# Patient Record
Sex: FEMALE | Race: WHITE | NOT HISPANIC OR LATINO | Employment: UNEMPLOYED | URBAN - METROPOLITAN AREA
[De-identification: names, ages, dates, MRNs, and addresses within clinical notes are randomized per-mention and may not be internally consistent; named-entity substitution may affect disease eponyms.]

---

## 2023-04-25 DIAGNOSIS — Z12.11 SCREENING FOR COLON CANCER: Primary | ICD-10-CM

## 2023-06-05 DIAGNOSIS — Z12.11 SCREENING FOR COLON CANCER: ICD-10-CM

## 2023-06-05 DIAGNOSIS — Z80.0 FAMILY HISTORY OF COLON CANCER: Primary | ICD-10-CM

## 2023-06-05 NOTE — TELEPHONE ENCOUNTER
----- Message from Finn Wilhelm MA sent at 6/5/2023  2:29 PM CDT -----  Contact: Fiona    ----- Message -----  From: Rachele Camp LPN  Sent: 5/24/2023  10:14 AM CDT  To: [facility]ology Procedure Scheduling      ----- Message -----  From: Emeli Hall  Sent: 5/24/2023   8:41 AM CDT  To: [facility] Staff    Fiona needs  call back at [phone], Regards to getting her colonoscopy screening schedule with referral.    Thanks  Td

## 2023-06-07 VITALS — WEIGHT: 188 LBS | HEIGHT: 63 IN | BODY MASS INDEX: 33.31 KG/M2

## 2023-06-07 RX ORDER — ROSUVASTATIN CALCIUM 10 MG/1
10 TABLET, COATED ORAL
COMMUNITY
Start: 2023-06-02

## 2023-06-07 RX ORDER — SOD SULF/POT CHLORIDE/MAG SULF 1.479 G
12 TABLET ORAL DAILY
Qty: 24 TABLET | Refills: 0 | Status: SHIPPED | OUTPATIENT
Start: 2023-06-07 | End: 2023-06-23

## 2023-06-07 RX ORDER — VENLAFAXINE HYDROCHLORIDE 150 MG/1
CAPSULE, EXTENDED RELEASE ORAL
COMMUNITY
Start: 2023-04-01

## 2023-06-07 NOTE — TELEPHONE ENCOUNTER
Returned callers call and got her scheduled for her first colonoscopy. Pt voiced no hx of cpap, heart stent or walking problem. Pt voiced understood information will be emailed and link also

## 2023-06-14 DIAGNOSIS — R31.9 HEMATURIA: Primary | ICD-10-CM

## 2023-06-23 ENCOUNTER — OFFICE VISIT (OUTPATIENT)
Dept: OBSTETRICS AND GYNECOLOGY | Facility: CLINIC | Age: 48
End: 2023-06-23
Payer: COMMERCIAL

## 2023-06-23 VITALS
SYSTOLIC BLOOD PRESSURE: 116 MMHG | HEART RATE: 93 BPM | BODY MASS INDEX: 33.3 KG/M2 | WEIGHT: 188 LBS | DIASTOLIC BLOOD PRESSURE: 75 MMHG

## 2023-06-23 DIAGNOSIS — Z80.3 FAMILY HISTORY OF BREAST CANCER: ICD-10-CM

## 2023-06-23 DIAGNOSIS — Z91.89 HIGH RISK OF OVARIAN CANCER: ICD-10-CM

## 2023-06-23 DIAGNOSIS — Z91.89 INCREASED RISK OF BREAST CANCER: ICD-10-CM

## 2023-06-23 DIAGNOSIS — Z12.31 SCREENING MAMMOGRAM FOR BREAST CANCER: ICD-10-CM

## 2023-06-23 DIAGNOSIS — Z01.419 WELL WOMAN EXAM WITH ROUTINE GYNECOLOGICAL EXAM: Primary | ICD-10-CM

## 2023-06-23 PROCEDURE — 3008F PR BODY MASS INDEX (BMI) DOCUMENTED: ICD-10-PCS | Mod: CPTII,S$GLB,,

## 2023-06-23 PROCEDURE — 3008F BODY MASS INDEX DOCD: CPT | Mod: CPTII,S$GLB,,

## 2023-06-23 PROCEDURE — 99386 PREV VISIT NEW AGE 40-64: CPT | Mod: S$GLB,,,

## 2023-06-23 PROCEDURE — 3074F PR MOST RECENT SYSTOLIC BLOOD PRESSURE < 130 MM HG: ICD-10-PCS | Mod: CPTII,S$GLB,,

## 2023-06-23 PROCEDURE — 3074F SYST BP LT 130 MM HG: CPT | Mod: CPTII,S$GLB,,

## 2023-06-23 PROCEDURE — 1159F PR MEDICATION LIST DOCUMENTED IN MEDICAL RECORD: ICD-10-PCS | Mod: CPTII,S$GLB,,

## 2023-06-23 PROCEDURE — 3078F DIAST BP <80 MM HG: CPT | Mod: CPTII,S$GLB,,

## 2023-06-23 PROCEDURE — 1159F MED LIST DOCD IN RCRD: CPT | Mod: CPTII,S$GLB,,

## 2023-06-23 PROCEDURE — 3078F PR MOST RECENT DIASTOLIC BLOOD PRESSURE < 80 MM HG: ICD-10-PCS | Mod: CPTII,S$GLB,,

## 2023-06-23 PROCEDURE — 99386 PR PREVENTIVE VISIT,NEW,40-64: ICD-10-PCS | Mod: S$GLB,,,

## 2023-06-23 RX ORDER — POLYETHYLENE GLYCOL 3350, SODIUM SULFATE ANHYDROUS, SODIUM BICARBONATE, SODIUM CHLORIDE, POTASSIUM CHLORIDE 236; 22.74; 6.74; 5.86; 2.97 G/4L; G/4L; G/4L; G/4L; G/4L
POWDER, FOR SOLUTION ORAL
COMMUNITY
Start: 2023-06-15

## 2023-06-23 RX ORDER — VENLAFAXINE HYDROCHLORIDE 37.5 MG/1
CAPSULE, EXTENDED RELEASE ORAL
COMMUNITY
Start: 2023-06-05

## 2023-06-23 NOTE — PROGRESS NOTES
Subjective:      Patient ID: Fiona Siegel is a 47 y.o. female who presents for evaluation today.    Chief Complaint:    Well Woman      History of Present Illness  Annual Exam (Premenopausal) - Patient presents for annual exam. The patient has no complaints today. The patient is sexually active. GYN screening history: last pap: was abnormal: unknown result and last mammogram: was normal. The patient wears seatbelts: yes. The patient participates in regular exercise: not asked. Has the patient ever been transfused or tattooed?: not asked. The patient reports that there is not domestic violence in her life. She recently did genetic testing and is BRCA2+ with increased lifetime risk. Her grandmother & 3 aunts had breast CA. Last mammo was normal on . We discussed adding yearly MRIs. She has one ovary, has had an ablation in . She has not had bleeding in 5-6 years. She struggles with night sweats & hot flashes, vaginal dryness, & low libido. We discussed Bonafide supplements due to her family history & increased risk of breast cancer. She is seeing urology next week for hematuria. Bowels are regular. She is seeing oncology Dr. Meredith tai, referred by her PCP due to abnormal labs.    GYN History  No LMP recorded (lmp unknown). Patient has had an ablation.   Date of Last Pap: Pap smear completed today with HPV co-testing    VITALS  /75   Pulse 93   Wt 85.3 kg (188 lb)   LMP  (LMP Unknown)   BMI 33.30 kg/m²   Weight: 85.3 kg (188 lb)         PAST MEDICAL HISTORY  Past Medical History:   Diagnosis Date    Amenorrhea Age 11    Irregular periods since onset at age 11    Anxiety     Take anti depression meds    BMI 33.0-33.9,adult     Depression     Encounter for blood transfusion 89    Emergency c section    High cholesterol     Hormone disorder Always    Low estrogen       PAST SURGICAL HISTORY  Past Surgical History:   Procedure Laterality Date    ABLATION       SECTION      X 2     CHOLECYSTECTOMY      ENDOMETRIAL ABLATION  7/2009    HYSTEROSCOPY  7/2009    RBA    OOPHORECTOMY  July 2009    Right ovary /tube removed    TUBAL LIGATION  1/1998    After birth of 2nd child       SOCIAL HISTORY  Social History     Tobacco Use   Smoking Status Every Day    Packs/day: 1.00    Years: 15.00    Pack years: 15.00    Types: Cigarettes   Smokeless Tobacco Never   Tobacco Comments    Smoke 1/2 pack - 1 pack day   ,   Social History     Substance and Sexual Activity   Alcohol Use Not Currently    Alcohol/week: 3.0 standard drinks    Types: 3 Cans of beer per week    Comment: Occasionally on holidays        MEDICATIONS  Outpatient Medications Marked as Taking for the 6/23/23 encounter (Office Visit) with Imani Lara NP   Medication Sig Dispense Refill    polyethylene glycol (GOLYTELY) 236-22.74-6.74 -5.86 gram suspension       rosuvastatin (CRESTOR) 10 MG tablet Take 10 mg by mouth.      venlafaxine (EFFEXOR-XR) 150 MG Cp24       venlafaxine (EFFEXOR-XR) 37.5 MG 24 hr capsule TAKE 1 CAPSULE BY MOUTH ONCE DAILY TO BE TAKEN WITH 150MG DOSAGE           Review of Systems   Review of Systems   Constitutional:  Negative for activity change, chills and fever.   Eyes:  Negative for visual disturbance.   Respiratory:  Negative for shortness of breath.    Cardiovascular:  Negative for chest pain.   Gastrointestinal:  Negative for abdominal pain, constipation, diarrhea, nausea and vomiting.   Genitourinary:  Positive for decreased libido, flank pain, frequency, hot flashes, urgency and vaginal dryness. Negative for dysuria, hematuria, menorrhagia, pelvic pain, vaginal bleeding and vaginal discharge.        No abnormal vaginal bleeding   Musculoskeletal:  Negative for back pain.   Integumentary:  Negative for rash and breast mass.   Neurological:  Negative for numbness and headaches.   Psychiatric/Behavioral:          No mood disturbance or changes    Breast: Negative for mass        Objective:     Physical Exam:    Constitutional: She is oriented to person, place, and time. She appears well-developed. She is cooperative.    HENT:   Head: Normocephalic.     Neck: Trachea normal. No thyromegaly present.    Cardiovascular:  Normal rate, regular rhythm and normal heart sounds.             Pulmonary/Chest: Effort normal and breath sounds normal. Right breast exhibits no mass, no nipple discharge and no skin change. Left breast exhibits no mass, no nipple discharge and no skin change.        Abdominal: Soft. There is no abdominal tenderness. There is no rebound and no guarding.     Genitourinary:    Vagina and uterus normal.      Pelvic exam was performed with patient supine.   The external female genitalia was normal.   Labial bartholins normal.There is no lesion on the right labia. There is no lesion on the left labia. Cervix is normal. Right adnexum displays no mass and no tenderness. Left adnexum displays no mass and no tenderness. Cervix exhibits no discharge.    pap smear completedUterus is not enlarged and not tender.              Lymphadenopathy:        Head (right side): No submental and no submandibular adenopathy present.        Head (left side): No submental and no submandibular adenopathy present.     She has no cervical adenopathy.    Neurological: She is alert and oriented to person, place, and time.    Skin: Skin is warm.    Psychiatric: She has a normal mood and affect. Her speech is normal and behavior is normal. Thought content normal.        Assessment:        1. Well woman exam with routine gynecological exam    2. Screening mammogram for breast cancer    3. Increased risk of breast cancer    4. High risk of ovarian cancer    5. Family history of breast cancer       Well woman exam with routine gynecological exam  -     Liquid-based pap smear, screening    Screening mammogram for breast cancer  -     Mammo Digital Screening Bilat; Future; Expected date: 06/23/2023    Increased risk of breast cancer  -     MRI  Breast w/wo Contrast, w/CAD, Bilateral; Future; Expected date: 06/23/2023    High risk of ovarian cancer  -     ; Future; Expected date: 06/23/2023  -     US OB/GYN Procedure ([facility]); Future    Family history of breast cancer  -     MRI Breast w/wo Contrast, w/CAD, Bilateral; Future; Expected date: 06/23/2023         Plan:     Patient instructed to contact the clinic should any questions or concerns arise prior to her next office visit. Patient is happy with the plan of care at this time, verbalizes understanding and denies outstanding questions.      Pap  Mammogram+MRI  Self-breast exams  Bonafide samples & info given  Consider annual health panel with Primary Care if not done  Colonoscopy as indicated  If you don't hear from the office regarding results within 1 week, please call  Follow up in 1 year for annual or sooner as needed  Chaperone present for exam

## 2023-06-26 ENCOUNTER — TELEPHONE (OUTPATIENT)
Dept: OBSTETRICS AND GYNECOLOGY | Facility: CLINIC | Age: 48
End: 2023-06-26
Payer: COMMERCIAL

## 2023-06-26 DIAGNOSIS — Z12.11 SCREENING FOR COLON CANCER: ICD-10-CM

## 2023-06-26 DIAGNOSIS — Z80.0 FAMILY HISTORY OF COLON CANCER: Primary | ICD-10-CM

## 2023-06-26 NOTE — PROGRESS NOTES
"[facility]  [address] [address]  Phone: [phone]  Fax: [phone]    History & Physical         Provider: Dr. Jalil Arriaga    Patient Name: Fiona JACKSON (age):1975  47 y.o.           Gender: female   Phone: [phone]     Referring Physician: Josh Hernández     Vital Signs:   Height - 5' 3"  Weight - 188 lbs  BMI -  33.30    Plan: Colonoscopy    Encounter Diagnoses   Name Primary?    Family history of colon cancer Yes    Screening for colon cancer            History:      Past Medical History:   Diagnosis Date    Amenorrhea Age 11    Irregular periods since onset at age 11    Anxiety     Take anti depression meds    BMI 33.0-33.9,adult     Depression     Encounter for blood transfusion 89    Emergency c section    High cholesterol     Hormone disorder Always    Low estrogen      Past Surgical History:   Procedure Laterality Date    ABLATION       SECTION      X 2    CHOLECYSTECTOMY      ENDOMETRIAL ABLATION  2009    HYSTEROSCOPY  2009    RBA    OOPHORECTOMY  2009    Right ovary /tube removed    TUBAL LIGATION  1998    After birth of 2nd child      Medication List with Changes/Refills   Current Medications    POLYETHYLENE GLYCOL (GOLYTELY) 236-22.74-6.74 -5.86 GRAM SUSPENSION        ROSUVASTATIN (CRESTOR) 10 MG TABLET    Take 10 mg by mouth.    VENLAFAXINE (EFFEXOR-XR) 150 MG CP24        VENLAFAXINE (EFFEXOR-XR) 37.5 MG 24 HR CAPSULE    TAKE 1 CAPSULE BY MOUTH ONCE DAILY TO BE TAKEN WITH 150MG DOSAGE      Review of patient's allergies indicates:   Allergen Reactions    Sulfa (sulfonamide antibiotics)       Family History   Problem Relation Age of Onset    Asthma Mother     Migraines Mother     Lung cancer Father 50    Cancer Father         Lung /asbestos    No Known Problems Brother     Asthma Maternal Grandmother     Breast cancer Maternal Grandmother "     Cancer Maternal Grandmother         Breast cancer spread into lung cancer    No Known Problems Maternal Grandfather     Diabetes Paternal Grandmother     No Known Problems Paternal Grandfather     Colon cancer Maternal Aunt     Breast cancer Maternal Aunt     Colon cancer Cousin     Colon cancer Maternal Cousin     Cancer Maternal Uncle         Blood cancer    Cancer Paternal Aunt         Blood cancer      Social History     Tobacco Use    Smoking status: Every Day     Packs/day: 1.00     Years: 15.00     Pack years: 15.00     Types: Cigarettes    Smokeless tobacco: Never    Tobacco comments:     Smoke 1/2 pack - 1 pack day   Substance Use Topics    Alcohol use: Not Currently     Alcohol/week: 3.0 standard drinks     Types: 3 Cans of beer per week     Comment: Occasionally on holidays    Drug use: Never        Physical Examination:     General Appearance:___________________________  HEENT: _____________________________________  Abdomen:____________________________________  Heart:________________________________________  Lungs:_______________________________________  Extremities:___________________________________  Skin:_________________________________________  Endocrine:____________________________________  Genitourinary:_________________________________  Neurological:__________________________________      Patient has been evaluated immediately prior to sedation and is medically cleared for endoscopy with IVCS as an ASA class: ______      Physician Signature: _________________________       Date: ________  Time: ________

## 2023-06-26 NOTE — TELEPHONE ENCOUNTER
Spoke with pt to let her know that her ins is out of network. I advised her to call her ins and ask them who is in network, and we can fax her MRI order there. She understood and will let me know.

## 2023-06-28 ENCOUNTER — TELEPHONE (OUTPATIENT)
Dept: OBSTETRICS AND GYNECOLOGY | Facility: CLINIC | Age: 48
End: 2023-06-28
Payer: COMMERCIAL

## 2023-06-28 NOTE — TELEPHONE ENCOUNTER
----- Message from Erin Lai sent at 2023 10:09 AM CDT -----  Contact: self  She received a notification that a report was ready for her, but upon viewing it in [employer] it has a different patient's name and . Please call back at [phone]

## 2023-06-30 ENCOUNTER — OFFICE VISIT (OUTPATIENT)
Dept: UROLOGY | Facility: CLINIC | Age: 48
End: 2023-06-30
Payer: COMMERCIAL

## 2023-06-30 VITALS
DIASTOLIC BLOOD PRESSURE: 64 MMHG | WEIGHT: 186 LBS | RESPIRATION RATE: 20 BRPM | HEIGHT: 63 IN | HEART RATE: 104 BPM | BODY MASS INDEX: 32.96 KG/M2 | SYSTOLIC BLOOD PRESSURE: 127 MMHG

## 2023-06-30 DIAGNOSIS — R31.9 HEMATURIA: ICD-10-CM

## 2023-06-30 DIAGNOSIS — R31.29 MICROSCOPIC HEMATURIA: Primary | ICD-10-CM

## 2023-06-30 LAB
BILIRUBIN, UA POC OHS: NEGATIVE
BLOOD, UA POC OHS: ABNORMAL
CLARITY, UA POC OHS: CLEAR
COLOR, UA POC OHS: YELLOW
GLUCOSE, UA POC OHS: NEGATIVE
KETONES, UA POC OHS: NEGATIVE
LEUKOCYTES, UA POC OHS: ABNORMAL
NITRITE, UA POC OHS: NEGATIVE
PH, UA POC OHS: 5.5
PROTEIN, UA POC OHS: 100
SPECIFIC GRAVITY, UA POC OHS: 1.01
UROBILINOGEN, UA POC OHS: 1

## 2023-06-30 PROCEDURE — 1160F PR REVIEW ALL MEDS BY PRESCRIBER/CLIN PHARMACIST DOCUMENTED: ICD-10-PCS | Mod: CPTII,S$GLB,, | Performed by: FAMILY MEDICINE

## 2023-06-30 PROCEDURE — 1160F RVW MEDS BY RX/DR IN RCRD: CPT | Mod: CPTII,S$GLB,, | Performed by: FAMILY MEDICINE

## 2023-06-30 PROCEDURE — 3008F PR BODY MASS INDEX (BMI) DOCUMENTED: ICD-10-PCS | Mod: CPTII,S$GLB,, | Performed by: FAMILY MEDICINE

## 2023-06-30 PROCEDURE — 99214 PR OFFICE/OUTPT VISIT, EST, LEVL IV, 30-39 MIN: ICD-10-PCS | Mod: S$GLB,,, | Performed by: FAMILY MEDICINE

## 2023-06-30 PROCEDURE — 3078F DIAST BP <80 MM HG: CPT | Mod: CPTII,S$GLB,, | Performed by: FAMILY MEDICINE

## 2023-06-30 PROCEDURE — 3078F PR MOST RECENT DIASTOLIC BLOOD PRESSURE < 80 MM HG: ICD-10-PCS | Mod: CPTII,S$GLB,, | Performed by: FAMILY MEDICINE

## 2023-06-30 PROCEDURE — 3008F BODY MASS INDEX DOCD: CPT | Mod: CPTII,S$GLB,, | Performed by: FAMILY MEDICINE

## 2023-06-30 PROCEDURE — 81003 POCT URINALYSIS(INSTRUMENT): ICD-10-PCS | Mod: QW,S$GLB,, | Performed by: FAMILY MEDICINE

## 2023-06-30 PROCEDURE — 1159F MED LIST DOCD IN RCRD: CPT | Mod: CPTII,S$GLB,, | Performed by: FAMILY MEDICINE

## 2023-06-30 PROCEDURE — 99214 OFFICE O/P EST MOD 30 MIN: CPT | Mod: S$GLB,,, | Performed by: FAMILY MEDICINE

## 2023-06-30 PROCEDURE — 3074F PR MOST RECENT SYSTOLIC BLOOD PRESSURE < 130 MM HG: ICD-10-PCS | Mod: CPTII,S$GLB,, | Performed by: FAMILY MEDICINE

## 2023-06-30 PROCEDURE — 81003 URINALYSIS AUTO W/O SCOPE: CPT | Mod: QW,S$GLB,, | Performed by: FAMILY MEDICINE

## 2023-06-30 PROCEDURE — 1159F PR MEDICATION LIST DOCUMENTED IN MEDICAL RECORD: ICD-10-PCS | Mod: CPTII,S$GLB,, | Performed by: FAMILY MEDICINE

## 2023-06-30 PROCEDURE — 3074F SYST BP LT 130 MM HG: CPT | Mod: CPTII,S$GLB,, | Performed by: FAMILY MEDICINE

## 2023-06-30 NOTE — PROGRESS NOTES
Subjective:       Patient ID: Fiona Siegel is a 47 y.o. female.    Chief Complaint: microscopic hematuria      HPI: 47-year-old female patient presenting to the clinic to establish care for microscopic hematuria.  Patient has been told that she has blood in her urine on her last 3 consecutive urinalysis.  Patient denies gross hematuria.  She is a smoker.  She is been smoking for the past 15 years.  She had a renal ultrasound done recently that she states was negative.  Asked the patient to obtain the report of this ultrasound.       Past Medical History:   Past Medical History:   Diagnosis Date    Amenorrhea Age 11    Irregular periods since onset at age 11    Anxiety     Take anti depression meds    BMI 33.0-33.9,adult     Depression     Encounter for blood transfusion 89    Emergency c section    High cholesterol     Hormone disorder Always    Low estrogen       Past Surgical Historical:   Past Surgical History:   Procedure Laterality Date    ABLATION       SECTION      X 2    CHOLECYSTECTOMY      ENDOMETRIAL ABLATION  2009    HYSTEROSCOPY  2009    RBA    OOPHORECTOMY  2009    Right ovary /tube removed    TUBAL LIGATION  1998    After birth of 2nd child        Medications:   Medication List with Changes/Refills   Current Medications    POLYETHYLENE GLYCOL (GOLYTELY) 236-22.74-6.74 -5.86 GRAM SUSPENSION        ROSUVASTATIN (CRESTOR) 10 MG TABLET    Take 10 mg by mouth.    VENLAFAXINE (EFFEXOR-XR) 150 MG CP24        VENLAFAXINE (EFFEXOR-XR) 37.5 MG 24 HR CAPSULE    TAKE 1 CAPSULE BY MOUTH ONCE DAILY TO BE TAKEN WITH 150MG DOSAGE        Past Social History:   Social History     Socioeconomic History    Marital status:      Spouse name: Daren Siegel    Number of children: 2   Tobacco Use    Smoking status: Every Day     Packs/day: 1.00     Years: 15.00     Pack years: 15.00     Types: Cigarettes    Smokeless tobacco: Never    Tobacco comments:     Smoke 1/2 pack - 1 pack day   Substance  and Sexual Activity    Alcohol use: Not Currently     Alcohol/week: 3.0 standard drinks     Types: 3 Cans of beer per week     Comment: Occasionally on holidays    Drug use: Never    Sexual activity: Yes     Partners: Male     Birth control/protection: None       Allergies:   Review of patient's allergies indicates:   Allergen Reactions    Sulfa (sulfonamide antibiotics)         Family History:   Family History   Problem Relation Age of Onset    Asthma Mother     Migraines Mother     Lung cancer Father 50    Cancer Father         Lung /asbestos    No Known Problems Brother     Asthma Maternal Grandmother     Breast cancer Maternal Grandmother     Cancer Maternal Grandmother         Breast cancer spread into lung cancer    No Known Problems Maternal Grandfather     Diabetes Paternal Grandmother     No Known Problems Paternal Grandfather     Colon cancer Maternal Aunt     Breast cancer Maternal Aunt     Colon cancer Cousin     Colon cancer Maternal Cousin     Cancer Maternal Uncle         Blood cancer    Cancer Paternal Aunt         Blood cancer        Review of Systems:  Review of Systems   Constitutional:  Negative for activity change, appetite change, chills, diaphoresis, fatigue, fever and unexpected weight change.   HENT:  Negative for congestion, dental problem, drooling, ear discharge, ear pain, facial swelling, hearing loss, mouth sores, nosebleeds, postnasal drip, rhinorrhea, sinus pressure, sinus pain, sneezing, sore throat, tinnitus, trouble swallowing and voice change.    Eyes:  Negative for photophobia, pain, discharge, redness, itching and visual disturbance.   Respiratory:  Negative for apnea, cough, choking, chest tightness, shortness of breath, wheezing and stridor.    Cardiovascular:  Negative for chest pain and leg swelling.   Gastrointestinal:  Negative for abdominal distention, abdominal pain, anal bleeding, blood in stool, constipation, diarrhea, nausea, rectal pain and vomiting.   Endocrine:  Negative for cold intolerance, heat intolerance, polydipsia, polyphagia and polyuria.   Genitourinary:  Positive for urgency. Negative for decreased urine volume, difficulty urinating, dyspareunia, dysuria, enuresis, flank pain, frequency, genital sores, hematuria, menstrual problem, pelvic pain, vaginal bleeding, vaginal discharge and vaginal pain.   Musculoskeletal:  Negative for arthralgias, back pain, gait problem, joint swelling, myalgias, neck pain and neck stiffness.   Skin:  Negative for color change, pallor, rash and wound.   Allergic/Immunologic: Negative for environmental allergies, food allergies and immunocompromised state.   Neurological:  Negative for dizziness, tremors, seizures, syncope, facial asymmetry, speech difficulty, weakness, light-headedness, numbness and headaches.   Hematological:  Negative for adenopathy. Does not bruise/bleed easily.   Psychiatric/Behavioral:  Negative for agitation, behavioral problems, confusion, decreased concentration, dysphoric mood, hallucinations, self-injury, sleep disturbance and suicidal ideas. The patient is not nervous/anxious and is not hyperactive.      Physical Exam:  Physical Exam  Exam conducted with a chaperone present.   Constitutional:       Appearance: Normal appearance.   HENT:      Head: Normocephalic.      Nose: Nose normal.      Mouth/Throat:      Mouth: Mucous membranes are moist.      Pharynx: Oropharynx is clear.   Eyes:      Extraocular Movements: Extraocular movements intact.      Conjunctiva/sclera: Conjunctivae normal.      Pupils: Pupils are equal, round, and reactive to light.   Cardiovascular:      Rate and Rhythm: Normal rate and regular rhythm.      Pulses: Normal pulses.      Heart sounds: Normal heart sounds.   Pulmonary:      Effort: Pulmonary effort is normal.      Breath sounds: Normal breath sounds.   Abdominal:      General: Abdomen is flat. Bowel sounds are normal.      Palpations: Abdomen is soft.      Hernia: There is no  hernia in the left inguinal area or right inguinal area.   Genitourinary:     General: Normal vulva.      Pubic Area: No rash or pubic lice.       Labia:         Right: No rash, tenderness, lesion or injury.         Left: No rash, tenderness, lesion or injury.       Urethra: No prolapse, urethral pain, urethral swelling or urethral lesion.      Rectum: Normal.   Musculoskeletal:         General: Normal range of motion.      Cervical back: Normal range of motion and neck supple.   Lymphadenopathy:      Lower Body: No right inguinal adenopathy. No left inguinal adenopathy.   Skin:     General: Skin is warm and dry.      Capillary Refill: Capillary refill takes less than 2 seconds.   Neurological:      General: No focal deficit present.      Mental Status: She is alert and oriented to person, place, and time. Mental status is at baseline.   Psychiatric:         Mood and Affect: Mood normal.         Behavior: Behavior normal.         Thought Content: Thought content normal.         Judgment: Judgment normal.       Assessment/Plan:       Microscopic hematuria:  We will complete hematuria workup for this patient including cystoscopy.  She has had a recent negative renal ultrasound.  She is a smoker.  She denies any dysuria at this time.  Instructed patient to obtain report of renal ultrasound.  Urinalysis today shows 0-5 RBC    Urinary urge incontinence:  Long discussion about symptom improvement of her urinary incontinence.  Decrease fluid intake.  She complains of nocturia 2-3 times a night.  Instructed patient to decrease fluid intake 4 hours for foregoing asleep.  We also discussed timed voiding.  She would like to avoid taking any medications at this point due to multiple medical conditions and procedures in the upcoming future.  We will schedule her for six-month follow-up to talk about medications for this issue after she gets through some of these procedures.    Problem List Items Addressed This Visit     None  Visit Diagnoses       Microscopic hematuria    -  Primary    Relevant Orders    Cystoscopy    Hematuria        Relevant Orders    POCT Urinalysis(Instrument) (Completed)

## 2023-07-05 ENCOUNTER — OUTSIDE PLACE OF SERVICE (OUTPATIENT)
Dept: GASTROENTEROLOGY | Facility: CLINIC | Age: 48
End: 2023-07-05

## 2023-07-05 LAB — CRC RECOMMENDATION EXT: NORMAL

## 2023-07-05 PROCEDURE — G0105 COLORECTAL SCRN; HI RISK IND: HCPCS | Mod: ,,, | Performed by: INTERNAL MEDICINE

## 2023-07-05 PROCEDURE — G0105 COLORECTAL SCRN; HI RISK IND: ICD-10-PCS | Mod: ,,, | Performed by: INTERNAL MEDICINE

## 2023-07-06 DIAGNOSIS — N76.0 BACTERIAL VAGINOSIS: Primary | ICD-10-CM

## 2023-07-06 DIAGNOSIS — B96.89 BACTERIAL VAGINOSIS: Primary | ICD-10-CM

## 2023-07-06 LAB
CANCER AG125 SERPL-ACNC: 4.39 U/ML (ref 0–38.1)
Lab: NORMAL

## 2023-07-06 RX ORDER — TINIDAZOLE 500 MG/1
2 TABLET ORAL
Qty: 8 TABLET | Refills: 0 | Status: SHIPPED | OUTPATIENT
Start: 2023-07-06 | End: 2023-07-08

## 2023-07-21 ENCOUNTER — PROCEDURE VISIT (OUTPATIENT)
Dept: OBSTETRICS AND GYNECOLOGY | Facility: CLINIC | Age: 48
End: 2023-07-21
Payer: COMMERCIAL

## 2023-07-21 DIAGNOSIS — Z91.89 HIGH RISK OF OVARIAN CANCER: ICD-10-CM

## 2023-07-21 PROCEDURE — 76830 US OB/GYN PROCEDURE (VIEWPOINT): ICD-10-PCS | Mod: S$GLB,,, | Performed by: OBSTETRICS & GYNECOLOGY

## 2023-07-21 PROCEDURE — 76830 TRANSVAGINAL US NON-OB: CPT | Mod: S$GLB,,, | Performed by: OBSTETRICS & GYNECOLOGY

## 2023-07-24 ENCOUNTER — PATIENT MESSAGE (OUTPATIENT)
Dept: OBSTETRICS AND GYNECOLOGY | Facility: CLINIC | Age: 48
End: 2023-07-24
Payer: COMMERCIAL

## 2023-07-31 ENCOUNTER — TELEPHONE (OUTPATIENT)
Dept: UROLOGY | Facility: CLINIC | Age: 48
End: 2023-07-31
Payer: COMMERCIAL

## 2023-08-01 ENCOUNTER — PROCEDURE VISIT (OUTPATIENT)
Dept: UROLOGY | Facility: CLINIC | Age: 48
End: 2023-08-01
Payer: COMMERCIAL

## 2023-08-01 VITALS
HEIGHT: 63 IN | BODY MASS INDEX: 32.69 KG/M2 | OXYGEN SATURATION: 98 % | HEART RATE: 84 BPM | DIASTOLIC BLOOD PRESSURE: 69 MMHG | SYSTOLIC BLOOD PRESSURE: 120 MMHG | WEIGHT: 184.5 LBS | RESPIRATION RATE: 20 BRPM

## 2023-08-01 DIAGNOSIS — R31.29 MICROSCOPIC HEMATURIA: ICD-10-CM

## 2023-08-01 PROCEDURE — 52000 CYSTOSCOPY: ICD-10-PCS | Mod: S$GLB,,, | Performed by: UROLOGY

## 2023-08-01 PROCEDURE — 52000 CYSTOURETHROSCOPY: CPT | Mod: S$GLB,,, | Performed by: UROLOGY

## 2023-08-01 NOTE — PATIENT INSTRUCTIONS
Patient Education       Cystoscopy Discharge Instructions   About this topic   Your kidneys make urine. It is stored in your bladder. The urethra is a tube at the bottom of the bladder. Urine flows out of this tube. Sometimes, there is a blockage and urine is not able to leave the body.  A cystoscopy is a procedure that lets the doctor see the inside of your bladder and urethra. The doctor does it to:  Look for stones or tumors blocking the bladder and urethra  Look for changes or injury inside the bladder  Take a tissue sample from the inside of your bladder  Look for reasons for blood in the urine, pain with urination, or why you are passing urine often  Look for prostate problems     What care is needed at home?   Ask your doctor what you need to do when you go home. Make sure you ask questions if you do not understand what the doctor says. This way you will know what you need to do.  Take a warm bath or use a warm wet washcloth over the opening to the urethra. This will help to ease any pain. Do this as needed.  Drink 6 to 8 glasses of water a day and 3 to 4 glasses in the first few hours after the procedure to flush out your bladder and reduce irritation.  You may see some blood in your urine for a few days. This is normal.  Empty your bladder as soon as you feel the need to. Don't delay going to the bathroom. It stretches and weakens the bladder.  What follow-up care is needed?   Your doctor may ask you to make visits to the office to check on your progress. Be sure to keep these visits.  If you had a biopsy, talk with your doctor about the results.  What drugs may be needed?   The doctor may order drugs to:  Help with pain  Fight an infection  Help with bladder spasms  Will physical activity be limited?   Talk to your doctor about when you may go back to your normal activities like work, driving, or sex.  What problems could happen?   Bleeding  Infection  Injury to the bladder and urethra  Discomfort in the  urethra area  Burning sensation for a short time  Upset stomach  When do I need to call the doctor?   Signs of infection. These include a fever of 100.4°F (38°C) or higher, chills, pain with passing urine.  Pain that does not go away even with drugs or that lasts longer than 2 days  Too much blood in your urine  Passing large dime-sized clots  Cloudy urine  Little or no urine or not able to pass urine  Abdominal pain and nausea  Teach Back: Helping You Understand   The Teach Back Method helps you understand the information we are giving you. After you talk with the staff, tell them in your own words what you learned. This helps to make sure the staff has described each thing clearly. It also helps to explain things that may have been confusing. Before going home, make sure you can do these:  I can tell you about my procedure.  I can tell you what may help ease my pain.  I can tell you what I will do if I have a fever, chills, or am not able to pass urine.  Where can I learn more?   [employer] Society  https://www.cancer.org/treatment/understanding-your-diagnosis/tests/endoscopy/cystoscopy.html   [employer]  https://www.cancerresearchuk.org/about-cancer/bladder-cancer/getting-diagnosed/tests-diagnose/cystoscopy   N[facility] Choices  http://www.nhs.uk/conditions/Cystoscopy/Pages/Introduction.aspx   Last Reviewed Date   2021-04-22  Consumer Information Use and Disclaimer   This information is not specific medical advice and does not replace information you receive from your health care provider. This is only a brief summary of general information. It does NOT include all information about conditions, illnesses, injuries, tests, procedures, treatments, therapies, discharge instructions or life-style choices that may apply to you. You must talk with your health care provider for complete information about your health and treatment options. This information should not be used to decide whether or not to accept your  health care providers advice, instructions or recommendations. Only your health care provider has the knowledge and training to provide advice that is right for you.  Copyright   Copyright © 2021 [employer] and its affiliates and/or licensors. All rights reserved.

## 2023-08-01 NOTE — PROCEDURES
Cystoscopy    Date/Time: 8/1/2023 1:20 PM    Performed by: Morris Olea MD  Authorized by: Roxanne Sommer NP    Consent Done?:  Yes (Written)  Timeout: prior to procedure the correct patient, procedure, and site was verified    Prep: patient was prepped and draped in usual sterile fashion    Anesthesia:  Intraurethral instillation  Indications: hematuria    Position:  Supine  Anesthesia:  Intraurethral instillation  Patient sedated?: No    Preparation: Patient was prepped and draped in usual sterile fashion    Scope type:  Flexible cystoscope  External exam normal: Yes    Urethra normal: Yes    Comments:      The patient was brought to the procedure room placed on the table padded prepped and draped in usual sterile fashion in supine position. The cystoscope was inserted into the urethra and advanced the urethra was normal. The bladder was entered and inspected, it was found to be free of tumor stone or foreign body.  Bilateral ureteral orifices were identified and noted to be normal in appearance with clear efflux of urine at this point the scope was removed the patient tolerated the procedure well there were no complications.  Pelvic exam was performed and no abnormalities were noted.

## 2023-08-10 ENCOUNTER — TELEPHONE (OUTPATIENT)
Dept: OBSTETRICS AND GYNECOLOGY | Facility: CLINIC | Age: 48
End: 2023-08-10
Payer: COMMERCIAL

## 2023-08-10 NOTE — TELEPHONE ENCOUNTER
----- Message from Rosibel Briseno sent at 8/10/2023 11:42 AM CDT -----  Contact: self  Pt needs to cancel appt will call back to sarmad [phone] with any questions

## 2023-08-10 NOTE — TELEPHONE ENCOUNTER
Spoke to patient, she states she is unable to pay for in office procedure at this time and will call back in a couple of weeks to reschedule. Advised we will cancel appointment for tomorrow and will get her rescheduled when she is ready.

## 2023-10-02 ENCOUNTER — TELEPHONE (OUTPATIENT)
Dept: OBSTETRICS AND GYNECOLOGY | Facility: CLINIC | Age: 48
End: 2023-10-02
Payer: COMMERCIAL

## 2023-10-02 NOTE — TELEPHONE ENCOUNTER
Called & encouraged scheduling of colpo for HSIL/+ pap, as she cancelled apt in august and this needs to be addressed. She is getting back on her 's insurance on October 12th, so she will be able to afford the procedure fee. Re-explained with her BRCA2 gene she is at great risk for breast & ovarian cancers. Encouraged potential for preventative hysterectomy with BSO and mastectomy. She will consider hyst but is not interested in mastectomy at this time. Offered to refer to Dr. Clarke for discussion of surgical options, she would like to wait until after hyst. Will call to schedule colpo apt with Dr. King, at which she may further discuss surgery procedures as well.

## 2023-10-03 ENCOUNTER — PATIENT MESSAGE (OUTPATIENT)
Dept: OBSTETRICS AND GYNECOLOGY | Facility: CLINIC | Age: 48
End: 2023-10-03
Payer: COMMERCIAL

## 2023-11-16 ENCOUNTER — PATIENT MESSAGE (OUTPATIENT)
Dept: OBSTETRICS AND GYNECOLOGY | Facility: CLINIC | Age: 48
End: 2023-11-16
Payer: COMMERCIAL

## 2023-11-17 ENCOUNTER — PROCEDURE VISIT (OUTPATIENT)
Dept: OBSTETRICS AND GYNECOLOGY | Facility: CLINIC | Age: 48
End: 2023-11-17
Payer: COMMERCIAL

## 2023-11-17 DIAGNOSIS — Z15.02 BRCA GENE MUTATION POSITIVE IN FEMALE: ICD-10-CM

## 2023-11-17 DIAGNOSIS — Z15.01 BRCA GENE MUTATION POSITIVE IN FEMALE: ICD-10-CM

## 2023-11-17 DIAGNOSIS — F52.0 HYPOACTIVE SEXUAL DESIRE DISORDER: ICD-10-CM

## 2023-11-17 DIAGNOSIS — Z15.09 BRCA GENE MUTATION POSITIVE IN FEMALE: ICD-10-CM

## 2023-11-17 DIAGNOSIS — R87.613 HGSIL (HIGH GRADE SQUAMOUS INTRAEPITHELIAL LESION) ON PAP SMEAR OF CERVIX: Primary | ICD-10-CM

## 2023-11-17 PROCEDURE — 99214 OFFICE O/P EST MOD 30 MIN: CPT | Mod: 25,S$GLB,, | Performed by: OBSTETRICS & GYNECOLOGY

## 2023-11-17 PROCEDURE — 99214 PR OFFICE/OUTPT VISIT, EST, LEVL IV, 30-39 MIN: ICD-10-PCS | Mod: 25,S$GLB,, | Performed by: OBSTETRICS & GYNECOLOGY

## 2023-11-17 PROCEDURE — 57454 COLPOSCOPY: ICD-10-PCS | Mod: S$GLB,,, | Performed by: OBSTETRICS & GYNECOLOGY

## 2023-11-17 PROCEDURE — 57454 BX/CURETT OF CERVIX W/SCOPE: CPT | Mod: S$GLB,,, | Performed by: OBSTETRICS & GYNECOLOGY

## 2023-11-17 RX ORDER — FLIBANSERIN 100 MG/1
100 TABLET, FILM COATED ORAL NIGHTLY
Qty: 30 TABLET | Refills: 12 | Status: SHIPPED | OUTPATIENT
Start: 2023-11-17 | End: 2023-12-06 | Stop reason: SDUPTHER

## 2023-11-17 NOTE — PROCEDURES
I spent a long time with her discussing the BRCA 2 gene and her increse risk of breast and ovarian CA    rec A TLH and BSO   also she needs to consider breast Ca risk reduction surgery  discussed Dr Olivera

## 2023-11-17 NOTE — PROCEDURES
Colposcopy    Date/Time: 11/17/2023 10:00 AM    Performed by: Galindo King III, MD  Authorized by: Galindo King III, MD      Colposcopy Site:  Cervix  Acrowhite Lesion? Yes    Atypical Vessels: No    Transformation Zone Adequate?: Yes    Biopsy?: Yes         Location:  Cervix ((5 00))  ECC Performed?: Yes    LEEP Performed?: No       Long discussion with her regarding her BRCA gene 2 positive status  she desires tl have a [facility] BSO and is ready   R&B discussed

## 2023-12-06 DIAGNOSIS — F52.0 HYPOACTIVE SEXUAL DESIRE DISORDER: ICD-10-CM

## 2023-12-06 RX ORDER — FLIBANSERIN 100 MG/1
100 TABLET, FILM COATED ORAL NIGHTLY
Qty: 30 TABLET | Refills: 12 | Status: SHIPPED | OUTPATIENT
Start: 2023-12-06

## 2023-12-15 ENCOUNTER — DOCUMENTATION ONLY (OUTPATIENT)
Dept: GASTROENTEROLOGY | Facility: CLINIC | Age: 48
End: 2023-12-15
Payer: COMMERCIAL

## 2024-05-31 ENCOUNTER — PATIENT MESSAGE (OUTPATIENT)
Dept: OBSTETRICS AND GYNECOLOGY | Facility: CLINIC | Age: 49
End: 2024-05-31
Payer: COMMERCIAL

## 2024-07-05 ENCOUNTER — DOCUMENTATION ONLY (OUTPATIENT)
Dept: OBSTETRICS AND GYNECOLOGY | Facility: CLINIC | Age: 49
End: 2024-07-05
Payer: COMMERCIAL

## 2024-08-22 ENCOUNTER — TELEPHONE (OUTPATIENT)
Dept: OBSTETRICS AND GYNECOLOGY | Facility: CLINIC | Age: 49
End: 2024-08-22
Payer: COMMERCIAL

## 2024-08-22 NOTE — TELEPHONE ENCOUNTER
----- Message from Lanny Funk MA sent at 8/22/2024  4:48 PM CDT -----    ----- Message -----  From: Kassidy Torres  Sent: 8/22/2024   4:46 PM CDT  To: Fernando Medley III Staff    Type:  Needs Medical Advice    Who Called: Fiona Siegel    Symptoms (please be specific): -   How long has patient had these symptoms:  -  Pharmacy name and phone #:  -  Would the patient rather a call back or a response via [facility]?    Best Call Back Number: [phone]    Additional Information: pt needs to speak w/ Tabitha to RS procedure please call

## 2025-01-03 ENCOUNTER — PATIENT MESSAGE (OUTPATIENT)
Dept: OBSTETRICS AND GYNECOLOGY | Facility: CLINIC | Age: 50
End: 2025-01-03
Payer: COMMERCIAL

## 2025-02-07 ENCOUNTER — OFFICE VISIT (OUTPATIENT)
Dept: OBSTETRICS AND GYNECOLOGY | Facility: CLINIC | Age: 50
End: 2025-02-07
Payer: COMMERCIAL

## 2025-02-07 VITALS
WEIGHT: 188.38 LBS | BODY MASS INDEX: 33.37 KG/M2 | DIASTOLIC BLOOD PRESSURE: 73 MMHG | SYSTOLIC BLOOD PRESSURE: 111 MMHG | HEART RATE: 98 BPM

## 2025-02-07 DIAGNOSIS — Z98.890 POST-OPERATIVE STATE: ICD-10-CM

## 2025-02-07 DIAGNOSIS — Z15.09 BRCA GENE MUTATION POSITIVE IN FEMALE: Primary | ICD-10-CM

## 2025-02-07 DIAGNOSIS — Z15.01 BRCA GENE MUTATION POSITIVE IN FEMALE: Primary | ICD-10-CM

## 2025-02-07 DIAGNOSIS — Z15.02 BRCA GENE MUTATION POSITIVE IN FEMALE: Primary | ICD-10-CM

## 2025-02-07 DIAGNOSIS — R10.2 PELVIC PAIN: ICD-10-CM

## 2025-02-07 LAB
APPEARANCE, UA: CLEAR
BACTERIA SPEC CULT: ABNORMAL /HPF
BASOPHILS NFR BLD: 1.1 % (ref 0–3)
BILIRUB UR QL STRIP: NEGATIVE MG/DL
COLOR UR: ABNORMAL
EOSINOPHIL NFR BLD: 2.4 % (ref 1–3)
ERYTHROCYTE [DISTWIDTH] IN BLOOD BY AUTOMATED COUNT: 13.7 % (ref 12.5–18)
GLUCOSE (UA): NORMAL MG/DL
HCT VFR BLD AUTO: 44.7 % (ref 37–47)
HGB BLD-MCNC: 14.9 G/DL (ref 12–16)
HGB UR QL STRIP: 250 /UL
KETONES UR QL STRIP: NEGATIVE MG/DL
LEUKOCYTE ESTERASE UR QL STRIP: 25 /UL
LYMPHOCYTES NFR BLD: 30.7 % (ref 25–40)
MCH RBC QN AUTO: 31.8 PG (ref 27–31.2)
MCHC RBC AUTO-ENTMCNC: 33.3 G/DL (ref 31.8–35.4)
MCV RBC AUTO: 95.5 FL (ref 80–97)
MONOCYTES NFR BLD: 5.8 % (ref 1–15)
NEUTROPHILS # BLD AUTO: 10.95 10*3/UL (ref 1.8–7.7)
NEUTROPHILS NFR BLD: 59.4 % (ref 37–80)
NITRITE UR QL STRIP: NEGATIVE
NUCLEATED RED BLOOD CELLS: 0 %
PH UR STRIP: 5 PH (ref 5–8)
PLATELETS: 336 10*3/UL (ref 142–424)
PROT UR QL STRIP: 30 MG/DL
RBC # BLD AUTO: 4.68 10*6/UL (ref 4.2–5.4)
RBC #/AREA URNS HPF: ABNORMAL /HPF (ref 0–2)
RPR: NON REACTIVE
SP GR UR STRIP: 1.01 (ref 1–1.03)
SPECIMEN COLLECTION METHOD, URINE: ABNORMAL
SQUAMOUS EPITHELIAL, UA: ABNORMAL /LPF
UROBILINOGEN UR STRIP-ACNC: NORMAL MG/DL
WBC # BLD: 18.4 10*3/UL (ref 4.6–10.2)
WBC #/AREA URNS HPF: ABNORMAL /HPF (ref 0–5)

## 2025-02-07 PROCEDURE — 3008F BODY MASS INDEX DOCD: CPT | Mod: CPTII,,, | Performed by: OBSTETRICS & GYNECOLOGY

## 2025-02-07 PROCEDURE — 99213 OFFICE O/P EST LOW 20 MIN: CPT | Mod: S$PBB,,, | Performed by: OBSTETRICS & GYNECOLOGY

## 2025-02-07 PROCEDURE — 3078F DIAST BP <80 MM HG: CPT | Mod: CPTII,,, | Performed by: OBSTETRICS & GYNECOLOGY

## 2025-02-07 PROCEDURE — 3074F SYST BP LT 130 MM HG: CPT | Mod: CPTII,,, | Performed by: OBSTETRICS & GYNECOLOGY

## 2025-02-07 PROCEDURE — 1159F MED LIST DOCD IN RCRD: CPT | Mod: CPTII,,, | Performed by: OBSTETRICS & GYNECOLOGY

## 2025-02-07 RX ORDER — DEXTROAMPHETAMINE SACCHARATE, AMPHETAMINE ASPARTATE MONOHYDRATE, DEXTROAMPHETAMINE SULFATE AND AMPHETAMINE SULFATE 6.25; 6.25; 6.25; 6.25 MG/1; MG/1; MG/1; MG/1
CAPSULE, EXTENDED RELEASE ORAL
COMMUNITY
Start: 2025-01-10

## 2025-02-07 RX ORDER — IBUPROFEN 600 MG/1
600 TABLET ORAL 3 TIMES DAILY
Qty: 18 TABLET | Refills: 1 | Status: SHIPPED | OUTPATIENT
Start: 2025-02-07 | End: 2025-02-20

## 2025-02-07 RX ORDER — PROMETHAZINE HYDROCHLORIDE 12.5 MG/1
12.5 TABLET ORAL 4 TIMES DAILY
Qty: 8 TABLET | Refills: 1 | Status: SHIPPED | OUTPATIENT
Start: 2025-02-07 | End: 2025-02-20

## 2025-02-07 RX ORDER — EVOLOCUMAB 140 MG/ML
INJECTION, SOLUTION SUBCUTANEOUS
COMMUNITY
Start: 2025-01-19

## 2025-02-07 RX ORDER — VENLAFAXINE 37.5 MG/1
TABLET ORAL
COMMUNITY
Start: 2025-01-19

## 2025-02-07 RX ORDER — VENLAFAXINE HYDROCHLORIDE 150 MG/1
1 TABLET, EXTENDED RELEASE ORAL
COMMUNITY
Start: 2025-01-14 | End: 2025-02-20

## 2025-02-07 RX ORDER — OXYCODONE AND ACETAMINOPHEN 5; 325 MG/1; MG/1
1 TABLET ORAL EVERY 4 HOURS PRN
Qty: 12 TABLET | Refills: 0 | Status: SHIPPED | OUTPATIENT
Start: 2025-02-07 | End: 2025-02-20

## 2025-02-07 NOTE — PROGRESS NOTES
Patient ID: Fiona Siegel is a 47 y.o. female who presents for evaluation today.     Chief Complaint:    Here for surgery consent          History of Present Illness  She saw dr Shaver and had a neg cysto.   She has abnormal paps but her colpo showed LGSIL and neg ECC   The patient is sexually active. GYN screening history: last pap: was abnormal: unknown result and last mammogram: was normal.  She has not had a cycle in 5-6 years.   She has no vaginal.  She is having deep dyspareunia and she has deep pelvic pain .  She does have more pain on her left side which is the only ovary she has left    She recently did genetic testing and is BRCA2+ with increased lifetime risk. Her grandmother & 3 aunts had breast CA. Last mammo was normal on . We discussed adding yearly MRIs. She has one ovary, has had an ablation in . She has not had bleeding in 5-6 years. She struggles with night sweats & hot flashes, vaginal dryness, & low libido. We discussed Bonafide supplements due to her family history & increased risk of breast cancer. She is seeing urology next week for hematuria. Bowels are regular. She is seeing oncology Dr. Meredith tai, referred by her PCP due to abnormal labs.     GYN History  No LMP recorded (lmp unknown). Patient has had an ablation.   Date of Last Pap: Pap smear completed today with HPV co-testing     VITALS  /75   Pulse 93   Wt 85.3 kg (188 lb)   LMP  (LMP Unknown)   BMI 33.30 kg/m²   Weight: 85.3 kg (188 lb)          PAST MEDICAL HISTORY       Past Medical History:   Diagnosis Date    Amenorrhea Age 11     Irregular periods since onset at age 11    Anxiety      Take anti depression meds    BMI 33.0-33.9,adult      Depression      Encounter for blood transfusion 89     Emergency c section    High cholesterol      Hormone disorder Always     Low estrogen         PAST SURGICAL HISTORY        Past Surgical History:   Procedure Laterality Date    ABLATION         SECTION          X 2    CHOLECYSTECTOMY        ENDOMETRIAL ABLATION   7/2009    HYSTEROSCOPY   7/2009     RBA    OOPHORECTOMY   July 2009     Right ovary /tube removed    TUBAL LIGATION   1/1998     After birth of 2nd child         SOCIAL HISTORY  Tobacco Use History   Social History           Tobacco Use   Smoking Status Every Day    Packs/day: 1.00    Years: 15.00    Pack years: 15.00    Types: Cigarettes   Smokeless Tobacco Never   Tobacco Comments     Smoke 1/2 pack - 1 pack day      ,   Social History           Substance and Sexual Activity   Alcohol Use Not Currently    Alcohol/week: 3.0 standard drinks    Types: 3 Cans of beer per week     Comment: Occasionally on holidays         MEDICATIONS         Outpatient Medications Marked as Taking for the 6/23/23 encounter (Office Visit) with Imani Lara NP   Medication Sig Dispense Refill    polyethylene glycol (GOLYTELY) 236-22.74-6.74 -5.86 gram suspension          rosuvastatin (CRESTOR) 10 MG tablet Take 10 mg by mouth.        venlafaxine (EFFEXOR-XR) 150 MG Cp24          venlafaxine (EFFEXOR-XR) 37.5 MG 24 hr capsule TAKE 1 CAPSULE BY MOUTH ONCE DAILY TO BE TAKEN WITH 150MG DOSAGE                Review of Systems   Review of Systems   Constitutional:  Negative for activity change, chills and fever.   Eyes:  Negative for visual disturbance.   Respiratory:  Negative for shortness of breath.    Cardiovascular:  Negative for chest pain.   Gastrointestinal:  Negative for abdominal pain, constipation, diarrhea, nausea and vomiting.   Genitourinary:  Positive for decreased libido, flank pain, frequency, hot flashes, urgency and vaginal dryness. Negative for dysuria, hematuria, menorrhagia, pelvic pain, vaginal bleeding and vaginal discharge.        No abnormal vaginal bleeding   Musculoskeletal:  Negative for back pain.   Integumentary:  Negative for rash and breast mass.   Neurological:  Negative for numbness and headaches.   Psychiatric/Behavioral:          No mood disturbance  or changes    Breast: Negative for mass     Objective:      Objective  Physical Exam:   Constitutional: She is oriented to person, place, and time. She appears well-developed. She is cooperative.    HENT:   Head: Normocephalic.     Neck: Trachea normal. No thyromegaly present.    Cardiovascular:  Normal rate, regular rhythm and normal heart sounds.             Pulmonary/Chest: Effort normal and breath sounds normal. Right breast exhibits no mass, no nipple discharge and no skin change. Left breast exhibits no mass, no nipple discharge and no skin change.         Abdominal: Soft. There is no abdominal tenderness. There is no rebound and no guarding.     Genitourinary:    Vagina and uterus normal.      Pelvic exam was performed with patient supine.   The external female genitalia was normal.   Labial bartholins normal.There is no lesion on the right labia. There is no lesion on the left labia. Cervix is normal. Right adnexum displays no mass and no tenderness. Left adnexum displays no mass and no tenderness. Cervix exhibits no discharge.    pap smear completedUterus is not enlarged and not tender.              Lymphadenopathy:        Head (right side): No submental and no submandibular adenopathy present.        Head (left side): No submental and no submandibular adenopathy present.     She has no cervical adenopathy.    Neurological: She is alert and oriented to person, place, and time.    Skin: Skin is warm.    Psychiatric: She has a normal mood and affect. Her speech is normal and behavior is normal. Thought content normal.          Assessment:         Assessment  1. Dyspareunia    2. Pelvic pain    3. Increased risk of breast cancer    4. High risk of ovarian cancer    5. Family history of breast cancer           Plan   [facility] removal of her ovaries    and cysto

## 2025-02-12 ENCOUNTER — OUTSIDE PLACE OF SERVICE (OUTPATIENT)
Dept: OBSTETRICS AND GYNECOLOGY | Facility: CLINIC | Age: 50
End: 2025-02-12
Payer: COMMERCIAL

## 2025-02-12 ENCOUNTER — OUTSIDE PLACE OF SERVICE (OUTPATIENT)
Dept: OBSTETRICS AND GYNECOLOGY | Facility: CLINIC | Age: 50
End: 2025-02-12

## 2025-02-13 ENCOUNTER — TELEPHONE (OUTPATIENT)
Dept: OBSTETRICS AND GYNECOLOGY | Facility: CLINIC | Age: 50
End: 2025-02-13
Payer: COMMERCIAL

## 2025-02-13 NOTE — TELEPHONE ENCOUNTER
Spoke to patient to check in on her post operatively. She states she is doing well. She is up and moving around today. She went out for a small walk. Her pain is under control. She is voiding fine and passing gas. No bowel movement yet but states she is taking a stool softener.  She is scheduled for her post op visit next week. Advised she may reach out to us if she needs anything before that appt. She verbalized understanding.

## 2025-02-20 ENCOUNTER — OFFICE VISIT (OUTPATIENT)
Dept: OBSTETRICS AND GYNECOLOGY | Facility: CLINIC | Age: 50
End: 2025-02-20
Payer: COMMERCIAL

## 2025-02-20 VITALS
DIASTOLIC BLOOD PRESSURE: 61 MMHG | BODY MASS INDEX: 32.74 KG/M2 | HEART RATE: 93 BPM | WEIGHT: 184.81 LBS | SYSTOLIC BLOOD PRESSURE: 120 MMHG

## 2025-02-20 DIAGNOSIS — Z98.890 POST-OPERATIVE STATE: Primary | ICD-10-CM

## 2025-02-20 NOTE — PROGRESS NOTES
Subjective:       Patient ID: Fiona Siegel is a 49 y.o. female.    Chief Complaint:  Post-op Evaluation      History of Present Illness  She is doing well.  No new health issues,  No abnormal bleeding, No GI or Gu concerns,  No dyspareunia  Status post  TLH BSO   she is doing well with her BM which are good now.   She has no bladder concerns   she feels good and her Pathology is good  severe SHANTI 3 cx but clear margins     she has some night sweats   she fees this is better       GYN & OB History  No LMP recorded. Patient has had an ablation.     Date of Last Pap: 2023    OB History    Para Term  AB Living   2     2   SAB IAB Ectopic Multiple Live Births             # Outcome Date GA Lbr Chon/2nd Weight Sex Type Anes PTL Lv   2       CS-Unspec      1       CS-Unspec          Review of Systems  Review of Systems          Objective:    Physical Exam:   Constitutional: She is oriented to person, place, and time. She appears well-developed and well-nourished.        Pulmonary/Chest: Effort normal.        Abdominal: Soft.   Incision inspected and are healing nicely suture removed as indicated                  Neurological: She is alert and oriented to person, place, and time.    Skin: Skin is warm.    Psychiatric: She has a normal mood and affect. Her behavior is normal. Judgment and thought content normal.          Assessment:        1. Post-operative state                 Plan:             Pathology discussed   and follow up in 5 weeks    Pt is aware we call all results. If she does not hear from our office regarding her result within a week of having a study or procedure performed she is to call the office so that we can research the result for her.  Normal activities discussed.   She is to return for any reason.      Chaperone was present

## 2025-02-28 ENCOUNTER — PATIENT MESSAGE (OUTPATIENT)
Dept: OBSTETRICS AND GYNECOLOGY | Facility: CLINIC | Age: 50
End: 2025-02-28
Payer: COMMERCIAL

## 2025-03-25 ENCOUNTER — OFFICE VISIT (OUTPATIENT)
Dept: OBSTETRICS AND GYNECOLOGY | Facility: CLINIC | Age: 50
End: 2025-03-25
Payer: COMMERCIAL

## 2025-03-25 VITALS
DIASTOLIC BLOOD PRESSURE: 68 MMHG | BODY MASS INDEX: 33.2 KG/M2 | WEIGHT: 187.38 LBS | SYSTOLIC BLOOD PRESSURE: 95 MMHG | HEART RATE: 99 BPM

## 2025-03-25 DIAGNOSIS — Z98.890 POST-OPERATIVE STATE: ICD-10-CM

## 2025-03-25 DIAGNOSIS — Z15.09 BRCA GENE MUTATION POSITIVE IN FEMALE: ICD-10-CM

## 2025-03-25 DIAGNOSIS — Z91.89 INCREASED RISK OF BREAST CANCER: ICD-10-CM

## 2025-03-25 DIAGNOSIS — Z15.01 BRCA GENE MUTATION POSITIVE IN FEMALE: ICD-10-CM

## 2025-03-25 DIAGNOSIS — Z15.02 BRCA GENE MUTATION POSITIVE IN FEMALE: ICD-10-CM

## 2025-03-25 DIAGNOSIS — Z78.0 MENOPAUSE: Primary | ICD-10-CM

## 2025-03-25 PROCEDURE — 1159F MED LIST DOCD IN RCRD: CPT | Mod: CPTII,,, | Performed by: OBSTETRICS & GYNECOLOGY

## 2025-03-25 PROCEDURE — 3074F SYST BP LT 130 MM HG: CPT | Mod: CPTII,,, | Performed by: OBSTETRICS & GYNECOLOGY

## 2025-03-25 PROCEDURE — 99024 POSTOP FOLLOW-UP VISIT: CPT | Mod: ,,, | Performed by: OBSTETRICS & GYNECOLOGY

## 2025-03-25 PROCEDURE — 3078F DIAST BP <80 MM HG: CPT | Mod: CPTII,,, | Performed by: OBSTETRICS & GYNECOLOGY

## 2025-03-25 RX ORDER — ESTRADIOL 0.05 MG/D
1 FILM, EXTENDED RELEASE TRANSDERMAL
Qty: 8 PATCH | Refills: 3 | Status: SHIPPED | OUTPATIENT
Start: 2025-03-27 | End: 2026-03-27

## 2025-03-25 RX ORDER — VENLAFAXINE HYDROCHLORIDE 225 MG/1
TABLET, EXTENDED RELEASE ORAL
COMMUNITY
Start: 2025-03-24

## 2025-03-25 NOTE — PROGRESS NOTES
Subjective:       Patient ID: Fiona Siegel is a 49 y.o. female.    Chief Complaint:  6 wk post op      History of Present Illness  She is doing well.  No new health issues,  No abnormal bleeding, No GI or Gu concerns,  No dyspareunia  Status post TLH BSO  hx of BRCA gene        GYN & OB History  No LMP recorded (lmp unknown). Patient has had a hysterectomy.     Date of Last Pap: 2023    OB History    Para Term  AB Living   2     2   SAB IAB Ectopic Multiple Live Births             # Outcome Date GA Lbr Chon/2nd Weight Sex Type Anes PTL Lv   2       CS-Unspec      1       CS-Unspec          Review of Systems  Review of Systems          Objective:    Physical Exam:   Constitutional: She appears well-developed.             Abdominal: Soft.   Incisions dry and intact     Genitourinary:    Vagina normal.      Pelvic exam was performed with patient supine.     Labial bartholins normal.There is no tenderness or lesion on the right labia. There is no tenderness or lesion on the left labia. Cervix is absent.Uterus is absent.    Genitourinary Comments: The vaginal cuff is intact and healing nicely                 Neurological: She is alert.     Psychiatric: She has a normal mood and affect. Her speech is normal and behavior is normal.          Assessment:        1. Menopause    2. Post-operative state    3. Increased risk of breast cancer    4. BRCA gene mutation positive in female                 Plan:         She and I discussed the fact that despite having BRCA  genes I am not aware of data that increases her risk of breast cancer based on hormone replacement therapy.  Of course her BRCA status certainly increases her risk she would like to consider hormone replacement therapy she is meeting with someone to discuss her risk factors because of BRCA and lastly I have discussed with her at length about considering breast cancer reduction surgery    Pap discussed will return for her annual     Pt  Serum Na has risen 8meq today  Have started D5W at 125ml/hr to help correct this issue  The patient has been receiving signficiant pressor drips in NS  If sodium continues to rise, may need higher rate of D5W (which should not affect volume as high sNa suggests dehydration) vs change of drips to hypotonic solutions if possible  F/u repeat BMP this evening to ensure safe correction of sNa  Addendum: have discussed the case with the team  As the patient was significantly volume overloaded intraoperatively, the team would rather hold off on D5W  Instead, drips will be changed from containing NS to D5W and 9pm labs can be assessed  I would recommend a low rate of D5W overnight if sNa continues to trend up despite fluid composition change  is aware we call all results. If she does not hear from our office regarding her result within a week of having a study or procedure performed she is to call the office so that we can research the result for her.  Normal activities discussed.   She is to return for any reason.      Chaperone was present

## 2025-05-22 ENCOUNTER — PATIENT MESSAGE (OUTPATIENT)
Dept: OBSTETRICS AND GYNECOLOGY | Facility: CLINIC | Age: 50
End: 2025-05-22
Payer: COMMERCIAL

## 2025-05-22 DIAGNOSIS — Z13.820 OSTEOPOROSIS SCREENING: ICD-10-CM

## 2025-05-22 DIAGNOSIS — Z12.31 BREAST CANCER SCREENING BY MAMMOGRAM: Primary | ICD-10-CM

## 2025-05-22 DIAGNOSIS — Z78.0 MENOPAUSE: ICD-10-CM

## 2025-05-22 DIAGNOSIS — Z91.89 AT RISK FOR BONE DENSITY LOSS: ICD-10-CM

## 2025-07-14 DIAGNOSIS — Z78.0 MENOPAUSE: ICD-10-CM

## 2025-07-15 RX ORDER — ESTRADIOL 0.05 MG/D
1 FILM, EXTENDED RELEASE TRANSDERMAL
Qty: 8 PATCH | Refills: 3 | Status: SHIPPED | OUTPATIENT
Start: 2025-07-17 | End: 2026-07-17

## 2025-07-18 ENCOUNTER — TELEPHONE (OUTPATIENT)
Dept: HEMATOLOGY/ONCOLOGY | Facility: CLINIC | Age: 50
End: 2025-07-18
Payer: COMMERCIAL

## 2025-07-18 DIAGNOSIS — D72.829 LEUKOCYTOSIS, UNSPECIFIED TYPE: Primary | ICD-10-CM

## 2025-07-18 NOTE — TELEPHONE ENCOUNTER
Spoke to the patient regarding referral. Appointment date, time and location provided all questions answered. TTRN

## 2025-07-23 ENCOUNTER — OFFICE VISIT (OUTPATIENT)
Dept: HEMATOLOGY/ONCOLOGY | Facility: CLINIC | Age: 50
End: 2025-07-23
Payer: COMMERCIAL

## 2025-07-23 VITALS
RESPIRATION RATE: 16 BRPM | HEIGHT: 63 IN | OXYGEN SATURATION: 97 % | HEART RATE: 108 BPM | DIASTOLIC BLOOD PRESSURE: 80 MMHG | WEIGHT: 193.5 LBS | BODY MASS INDEX: 34.29 KG/M2 | SYSTOLIC BLOOD PRESSURE: 145 MMHG

## 2025-07-23 DIAGNOSIS — F17.200 CURRENT SMOKER: ICD-10-CM

## 2025-07-23 DIAGNOSIS — D72.829 LEUKOCYTOSIS, UNSPECIFIED TYPE: ICD-10-CM

## 2025-07-23 DIAGNOSIS — D72.825 BANDEMIA: Primary | ICD-10-CM

## 2025-07-23 RX ORDER — ERGOCALCIFEROL 1.25 MG/1
50000 CAPSULE ORAL
COMMUNITY

## 2025-07-25 PROBLEM — D72.825 BANDEMIA: Status: ACTIVE | Noted: 2025-07-25

## 2025-07-25 NOTE — PROGRESS NOTES
HEMATOLOGY INITIAL CONSULTATION NOTE    Patient ID: Fiona Siegel is a 49 y.o. female.    Chief Complaint:  leukocytosis    HPI:  Patient is a 49-year-old female with past medical history of amenorrhea, anxiety, depression, hyperlipidemia who was referred by her PCP for evaluation of leukocytosis noted on her labs.  Patient reports increase in leukocytosis over the last few months.  Presents to our clinic today for further evaluation.  Denies any lumps/bumps, activity and appetite changes.  Prior urinary tract infection is resolved per patient.            Past Medical History:   Diagnosis Date    Amenorrhea Age 11    Irregular periods since onset at age 11    Anxiety     Take anti depression meds    BMI 33.0-33.9,adult     Depression     Encounter for blood transfusion 89    Emergency c section    High cholesterol     Hormone disorder Always    Low estrogen       Family History   Problem Relation Name Age of Onset    Asthma Mother Keisha Martin     Migraines Mother Keisha Martin     Hyperlipidemia Mother Keisha Martin     Lung cancer Father Wm Dumas 50    Cancer Father Wm Dumas         Lung /asbestos    No Known Problems Brother      Colon cancer Maternal Aunt Francisca Beth     Breast cancer Maternal Aunt Francisca Beth     Cancer Maternal Uncle Myles rushing         Blood cancer    Cancer Paternal Aunt Amna BeaversPink         Blood cancer    Asthma Maternal Grandmother Emma valdes     Breast cancer Maternal Grandmother Emma valdes     Cancer Maternal Grandmother Emma valdes         Breast cancer spread into lung cancer    Lung cancer Maternal Grandfather      Cancer Maternal Grandfather      Diabetes Paternal Grandmother Caterina dumas     Colon cancer Cousin      Colon cancer Maternal Cousin         Social History[1]      Past Surgical History:   Procedure Laterality Date    ABLATION       SECTION      X 2    CHOLECYSTECTOMY      ENDOMETRIAL ABLATION  2009    HYSTERECTOMY       HYSTEROSCOPY  7/2009    RBA    OOPHORECTOMY  July 2009    Right ovary /tube removed    TUBAL LIGATION  1/1998    After birth of 2nd child                       Review of systems:  Review of Systems   Constitutional:  Negative for activity change, appetite change, chills, diaphoresis, fatigue and unexpected weight change.   HENT:  Negative for congestion, facial swelling, hearing loss, mouth sores, trouble swallowing and voice change.    Eyes:  Negative for photophobia, pain, discharge and itching.   Respiratory:  Negative for apnea, cough, choking, chest tightness and shortness of breath.    Cardiovascular:  Negative for chest pain, palpitations and leg swelling.   Gastrointestinal:  Negative for abdominal distention, abdominal pain, anal bleeding and blood in stool.   Endocrine: Negative for cold intolerance, heat intolerance, polydipsia and polyphagia.   Genitourinary:  Negative for difficulty urinating, dysuria, flank pain and hematuria.   Musculoskeletal:  Negative for arthralgias, back pain, joint swelling, myalgias, neck pain and neck stiffness.   Skin:  Negative for color change, pallor and wound.   Allergic/Immunologic: Negative for environmental allergies, food allergies and immunocompromised state.   Neurological:  Negative for dizziness, seizures, facial asymmetry, speech difficulty, light-headedness, numbness and headaches.   Hematological:  Negative for adenopathy. Does not bruise/bleed easily.   Psychiatric/Behavioral:  Negative for agitation, behavioral problems, confusion, decreased concentration and sleep disturbance.                                Physical Exam  Vitals and nursing note reviewed.   Constitutional:       General: She is not in acute distress.     Appearance: Normal appearance. She is not ill-appearing.   HENT:      Head: Normocephalic and atraumatic.      Nose: No congestion or rhinorrhea.   Eyes:      General: No scleral icterus.     Extraocular Movements: Extraocular movements  intact.      Pupils: Pupils are equal, round, and reactive to light.   Cardiovascular:      Rate and Rhythm: Normal rate and regular rhythm.      Pulses: Normal pulses.      Heart sounds: Normal heart sounds. No murmur heard.     No gallop.   Pulmonary:      Effort: Pulmonary effort is normal. No respiratory distress.      Breath sounds: Normal breath sounds. No stridor. No wheezing or rhonchi.   Abdominal:      General: Bowel sounds are normal. There is no distension.      Palpations: There is no mass.      Tenderness: There is no abdominal tenderness. There is no guarding.   Musculoskeletal:         General: No swelling, tenderness, deformity or signs of injury. Normal range of motion.      Cervical back: Normal range of motion and neck supple. No rigidity. No muscular tenderness.      Right lower leg: No edema.      Left lower leg: No edema.   Skin:     General: Skin is warm.      Coloration: Skin is not jaundiced or pale.      Findings: No bruising or lesion.   Neurological:      General: No focal deficit present.      Mental Status: She is alert and oriented to person, place, and time.      Cranial Nerves: No cranial nerve deficit.      Sensory: No sensory deficit.      Motor: No weakness.      Gait: Gait normal.   Psychiatric:         Mood and Affect: Mood normal.         Behavior: Behavior normal.         Thought Content: Thought content normal.       Vitals:    07/23/25 0919   BP: (!) 145/80   Pulse: 108   Resp: 16   Body surface area is 1.98 meters squared.    Assessment/Plan:      Leukocytosis:    == Discussed with her possible etiology of leukocytosis giving her opportunity to ask questions.  Patient is a chronic smoker and I discussed with her about leukocytosis noted in smokers so hence we could be looking at a reactive cause versus a primary bone marrow disorder.  I will obtain peripheral smear and flow cytometry along with checking for BCR-ABL gene rearrangement.  If any abnormalities are noted on  these investigations will consider a bone marrow biopsy.        2. Current smoker:    == Smoking cessation counseling done.  She will try to quit and/or cut down.  I will also send referral to smoking cessation counselor    Plan:  Initiate workup of leukocytosis:  Peripheral smear and flow cytometry, BCR-ABL gene rearrangement  Smoking cessation counseling and referral to counselor       Future Appointments   Date Time Provider Department Center   8/15/2025  8:30 AM LAB TESTING, [facility] [address]   8/20/2025  3:00 PM Alberto Alvarado MD [facility] [address]   3/26/2026  8:00 AM Imani Lara NP [id number]  [place]           Pt is instructed to RTC with labs for continued monitoring of treatment as instructed.     Total time spent in counseling and discussion about further management options including relevant lab work, treatment,  prognosis, medications and intended side effects was more than 60 minutes. More than 50 % of the time was spent in counseling and coordination of care.  I spent a total of 60 minutes on the day of the visit.This includes face to face time and non-face to face time preparing to see the patient (eg, review of tests), Obtaining and/or reviewing separately obtained history, Documenting clinical information in the electronic or other health record, Independently interpreting resultsand communicating results to the patient/family/caregiver, or Care coordination.          [1]   Social History  Socioeconomic History    Marital status:      Spouse name: Daren Siegel    Number of children: 2   Tobacco Use    Smoking status: Every Day     Current packs/day: 1.00     Average packs/day: 1 pack/day for 15.0 years (15.0 ttl pk-yrs)     Types: Cigarettes    Smokeless tobacco: Never    Tobacco comments:     Smoke 1/2 pack - 1 pack day   Substance and Sexual Activity    Alcohol use: Not Currently     Alcohol/week: 3.0 standard drinks of alcohol     Types: 3 Cans of beer per  week     Comment: Occasionally on holidays    Drug use: Never    Sexual activity: Yes     Partners: Male     Birth control/protection: See Surgical Hx     Social Drivers of Health     Financial Resource Strain: Low Risk  (7/21/2025)    Overall Financial Resource Strain (CARDIA)     Difficulty of Paying Living Expenses: Not very hard   Food Insecurity: No Food Insecurity (7/21/2025)    Hunger Vital Sign     Worried About Running Out of Food in the Last Year: Never true     Ran Out of Food in the Last Year: Never true   Transportation Needs: No Transportation Needs (7/21/2025)    PRAPARE - Transportation     Lack of Transportation (Medical): No     Lack of Transportation (Non-Medical): No   Physical Activity: Insufficiently Active (7/21/2025)    Exercise Vital Sign     Days of Exercise per Week: 2 days     Minutes of Exercise per Session: 10 min   Stress: Stress Concern Present (7/21/2025)    [employer] - Occupational Stress Questionnaire     Feeling of Stress : Very much   Housing Stability: Low Risk  (7/21/2025)    Housing Stability Vital Sign     Unable to Pay for Housing in the Last Year: No     Number of Times Moved in the Last Year: 1     Homeless in the Last Year: No

## 2025-08-06 ENCOUNTER — TELEPHONE (OUTPATIENT)
Dept: HEMATOLOGY/ONCOLOGY | Facility: CLINIC | Age: 50
End: 2025-08-06
Payer: COMMERCIAL

## 2025-08-20 ENCOUNTER — OFFICE VISIT (OUTPATIENT)
Dept: HEMATOLOGY/ONCOLOGY | Facility: CLINIC | Age: 50
End: 2025-08-20
Payer: COMMERCIAL

## 2025-08-20 VITALS
HEART RATE: 98 BPM | BODY MASS INDEX: 26.96 KG/M2 | HEIGHT: 63 IN | SYSTOLIC BLOOD PRESSURE: 128 MMHG | OXYGEN SATURATION: 95 % | WEIGHT: 152.19 LBS | RESPIRATION RATE: 16 BRPM | DIASTOLIC BLOOD PRESSURE: 80 MMHG

## 2025-08-20 DIAGNOSIS — D72.829 LEUKOCYTOSIS, UNSPECIFIED TYPE: Primary | ICD-10-CM

## 2025-08-20 DIAGNOSIS — D72.825 BANDEMIA: ICD-10-CM

## 2025-08-20 PROCEDURE — 3079F DIAST BP 80-89 MM HG: CPT | Mod: CPTII,,, | Performed by: INTERNAL MEDICINE

## 2025-08-20 PROCEDURE — 3074F SYST BP LT 130 MM HG: CPT | Mod: CPTII,,, | Performed by: INTERNAL MEDICINE

## 2025-08-20 PROCEDURE — 99214 OFFICE O/P EST MOD 30 MIN: CPT | Mod: S$PBB,,, | Performed by: INTERNAL MEDICINE

## 2025-08-20 PROCEDURE — 1160F RVW MEDS BY RX/DR IN RCRD: CPT | Mod: CPTII,,, | Performed by: INTERNAL MEDICINE

## 2025-08-20 PROCEDURE — 1159F MED LIST DOCD IN RCRD: CPT | Mod: CPTII,,, | Performed by: INTERNAL MEDICINE

## 2025-08-20 PROCEDURE — 3008F BODY MASS INDEX DOCD: CPT | Mod: CPTII,,, | Performed by: INTERNAL MEDICINE

## 2025-08-20 RX ORDER — ROSUVASTATIN CALCIUM 20 MG/1
20 TABLET, COATED ORAL DAILY
COMMUNITY